# Patient Record
Sex: MALE | Race: WHITE | NOT HISPANIC OR LATINO | URBAN - METROPOLITAN AREA
[De-identification: names, ages, dates, MRNs, and addresses within clinical notes are randomized per-mention and may not be internally consistent; named-entity substitution may affect disease eponyms.]

---

## 2017-10-10 ENCOUNTER — OUTPATIENT (OUTPATIENT)
Dept: OUTPATIENT SERVICES | Facility: HOSPITAL | Age: 77
LOS: 1 days | Discharge: HOME | End: 2017-10-10

## 2017-10-18 DIAGNOSIS — E78.00 PURE HYPERCHOLESTEROLEMIA, UNSPECIFIED: ICD-10-CM

## 2017-10-18 DIAGNOSIS — H25.11 AGE-RELATED NUCLEAR CATARACT, RIGHT EYE: ICD-10-CM

## 2017-10-18 DIAGNOSIS — G47.33 OBSTRUCTIVE SLEEP APNEA (ADULT) (PEDIATRIC): ICD-10-CM

## 2017-10-18 DIAGNOSIS — E11.9 TYPE 2 DIABETES MELLITUS WITHOUT COMPLICATIONS: ICD-10-CM

## 2017-10-18 DIAGNOSIS — I10 ESSENTIAL (PRIMARY) HYPERTENSION: ICD-10-CM

## 2018-01-18 PROBLEM — Z00.00 ENCOUNTER FOR PREVENTIVE HEALTH EXAMINATION: Status: ACTIVE | Noted: 2018-01-18

## 2018-02-09 ENCOUNTER — APPOINTMENT (OUTPATIENT)
Dept: UROLOGY | Facility: CLINIC | Age: 78
End: 2018-02-09
Payer: MEDICARE

## 2018-02-09 VITALS
HEIGHT: 71 IN | HEART RATE: 70 BPM | BODY MASS INDEX: 37.1 KG/M2 | SYSTOLIC BLOOD PRESSURE: 121 MMHG | DIASTOLIC BLOOD PRESSURE: 80 MMHG | WEIGHT: 265 LBS

## 2018-02-09 DIAGNOSIS — Z78.9 OTHER SPECIFIED HEALTH STATUS: ICD-10-CM

## 2018-02-09 DIAGNOSIS — E11.9 TYPE 2 DIABETES MELLITUS W/OUT COMPLICATIONS: ICD-10-CM

## 2018-02-09 DIAGNOSIS — N39.41 URGE INCONTINENCE: ICD-10-CM

## 2018-02-09 DIAGNOSIS — N40.0 BENIGN PROSTATIC HYPERPLASIA WITHOUT LOWER URINARY TRACT SYMPMS: ICD-10-CM

## 2018-02-09 LAB
BILIRUB UR QL STRIP: 1
CLARITY UR: CLEAR
COLLECTION METHOD: NORMAL
GLUCOSE UR-MCNC: NORMAL
HCG UR QL: 2 EU/DL
HGB UR QL STRIP.AUTO: 50
KETONES UR-MCNC: NORMAL
LEUKOCYTE ESTERASE UR QL STRIP: 75
NITRITE UR QL STRIP: NORMAL
PH UR STRIP: 5
PROT UR STRIP-MCNC: 30
SP GR UR STRIP: 1.02

## 2018-02-09 PROCEDURE — 81003 URINALYSIS AUTO W/O SCOPE: CPT | Mod: QW

## 2018-02-09 PROCEDURE — 99213 OFFICE O/P EST LOW 20 MIN: CPT

## 2018-02-09 RX ORDER — METHAZOLAMIDE 50 MG/1
50 TABLET ORAL
Qty: 90 | Refills: 0 | Status: ACTIVE | COMMUNITY
Start: 2017-07-05

## 2018-02-09 RX ORDER — CARVEDILOL 6.25 MG/1
6.25 TABLET, FILM COATED ORAL
Qty: 180 | Refills: 0 | Status: ACTIVE | COMMUNITY
Start: 2017-01-05

## 2018-02-09 RX ORDER — INSULIN LISPRO 100 [IU]/ML
(75-25) 100 INJECTION, SUSPENSION SUBCUTANEOUS
Qty: 60 | Refills: 0 | Status: ACTIVE | COMMUNITY
Start: 2017-04-12

## 2018-02-09 RX ORDER — ASPIRIN 81 MG
81 TABLET, DELAYED RELEASE (ENTERIC COATED) ORAL
Refills: 0 | Status: ACTIVE | COMMUNITY

## 2018-02-09 RX ORDER — PEN NEEDLE, DIABETIC 29 G X1/2"
31G X 5 MM NEEDLE, DISPOSABLE MISCELLANEOUS
Qty: 100 | Refills: 0 | Status: ACTIVE | COMMUNITY
Start: 2017-08-07

## 2018-02-09 RX ORDER — DULAGLUTIDE 1.5 MG/.5ML
1.5 INJECTION, SOLUTION SUBCUTANEOUS
Qty: 6 | Refills: 0 | Status: ACTIVE | COMMUNITY
Start: 2017-07-24

## 2018-02-09 RX ORDER — ENALAPRIL MALEATE 5 MG/1
5 TABLET ORAL
Qty: 90 | Refills: 0 | Status: ACTIVE | COMMUNITY
Start: 2017-03-18

## 2018-02-09 RX ORDER — ATORVASTATIN CALCIUM 20 MG/1
20 TABLET, FILM COATED ORAL
Qty: 90 | Refills: 0 | Status: ACTIVE | COMMUNITY
Start: 2017-07-25

## 2018-02-09 RX ORDER — METFORMIN HYDROCHLORIDE 850 MG/1
850 TABLET, COATED ORAL
Qty: 90 | Refills: 0 | Status: ACTIVE | COMMUNITY
Start: 2016-11-28

## 2018-02-09 RX ORDER — MIRABEGRON 50 MG/1
50 TABLET, FILM COATED, EXTENDED RELEASE ORAL
Qty: 30 | Refills: 0 | Status: ACTIVE | COMMUNITY
Start: 2017-09-04

## 2018-03-20 ENCOUNTER — APPOINTMENT (OUTPATIENT)
Dept: UROLOGY | Facility: CLINIC | Age: 78
End: 2018-03-20
Payer: MEDICARE

## 2018-03-20 VITALS — DIASTOLIC BLOOD PRESSURE: 69 MMHG | HEART RATE: 64 BPM | SYSTOLIC BLOOD PRESSURE: 116 MMHG

## 2018-03-20 PROBLEM — N39.41 URGE INCONTINENCE OF URINE: Status: ACTIVE | Noted: 2018-02-09

## 2018-03-20 LAB
BILIRUB UR QL STRIP: NORMAL
CLARITY UR: CLEAR
COLLECTION METHOD: NORMAL
GLUCOSE UR-MCNC: >500
HCG UR QL: 2 EU/DL
HGB UR QL STRIP.AUTO: NORMAL
KETONES UR-MCNC: NORMAL
LEUKOCYTE ESTERASE UR QL STRIP: NORMAL
NITRITE UR QL STRIP: NORMAL
PH UR STRIP: 6.5
PROT UR STRIP-MCNC: NORMAL
SP GR UR STRIP: 1.02

## 2018-03-20 PROCEDURE — 99213 OFFICE O/P EST LOW 20 MIN: CPT

## 2018-03-20 PROCEDURE — 81003 URINALYSIS AUTO W/O SCOPE: CPT | Mod: QW

## 2018-05-22 ENCOUNTER — APPOINTMENT (OUTPATIENT)
Dept: UROLOGY | Facility: CLINIC | Age: 78
End: 2018-05-22
Payer: MEDICARE

## 2018-05-22 VITALS
HEIGHT: 71 IN | BODY MASS INDEX: 37.1 KG/M2 | WEIGHT: 265 LBS | HEART RATE: 65 BPM | SYSTOLIC BLOOD PRESSURE: 115 MMHG | DIASTOLIC BLOOD PRESSURE: 66 MMHG

## 2018-05-22 PROCEDURE — 99213 OFFICE O/P EST LOW 20 MIN: CPT

## 2019-04-05 ENCOUNTER — APPOINTMENT (OUTPATIENT)
Dept: UROLOGY | Facility: CLINIC | Age: 79
End: 2019-04-05
Payer: MEDICARE

## 2019-04-05 VITALS
BODY MASS INDEX: 37.24 KG/M2 | HEART RATE: 65 BPM | SYSTOLIC BLOOD PRESSURE: 111 MMHG | WEIGHT: 266 LBS | DIASTOLIC BLOOD PRESSURE: 66 MMHG | HEIGHT: 71 IN

## 2019-04-05 PROCEDURE — 99213 OFFICE O/P EST LOW 20 MIN: CPT

## 2019-04-05 NOTE — ASSESSMENT
[FreeTextEntry1] : Elevation in PSA above his baseline may be biochemical recurrence but will repeat to confirm. This still elevated will need imaging and discussion regarding hormonal manipulation

## 2019-04-05 NOTE — HISTORY OF PRESENT ILLNESS
[FreeTextEntry1] : 78-year-old with history of prostate cancer status post radioactive seed implantation and external radiation in 2005 for a [place] 6. He recently saw a radiation oncologist Dr. Stephens. Pretreatment PSA was 9.2 and is currently 6.5 compared to 2.1 one year ago. He has no change in his urinary incontinence and uses a pad and continues on b.i.d. tamsulosin and myrbetriq 50 daily. He has had no relief with VESIcare. There is no new bone pain. There is no gross hematuria. He continues to use a cane for his chronic left knee pain

## 2019-04-23 ENCOUNTER — APPOINTMENT (OUTPATIENT)
Dept: UROLOGY | Facility: CLINIC | Age: 79
End: 2019-04-23
Payer: MEDICARE

## 2019-04-23 VITALS — WEIGHT: 266 LBS | BODY MASS INDEX: 37.24 KG/M2 | HEIGHT: 71 IN

## 2019-04-23 PROCEDURE — 99213 OFFICE O/P EST LOW 20 MIN: CPT

## 2019-04-23 NOTE — HISTORY OF PRESENT ILLNESS
[FreeTextEntry1] : He with history of prostate cancer treated with radioactive seed implantation and external radiation 2005 for Nae 6. Pretreatment PSA was 9.2 and one year ago was 2.1. Recently it was 6.5 and on repeat is 6.6. There is no bone pain or gross hematuria.

## 2019-04-23 NOTE — REVIEW OF SYSTEMS
[Fever] : no fever [Chest Pain] : no chest pain [Dysuria] : no dysuria [Constipation] : no constipation [Confused] : no confusion

## 2019-04-29 ENCOUNTER — OUTPATIENT (OUTPATIENT)
Dept: OUTPATIENT SERVICES | Facility: HOSPITAL | Age: 79
LOS: 1 days | Discharge: HOME | End: 2019-04-29
Payer: MEDICARE

## 2019-04-29 DIAGNOSIS — C61 MALIGNANT NEOPLASM OF PROSTATE: ICD-10-CM

## 2019-04-29 PROCEDURE — 74176 CT ABD & PELVIS W/O CONTRAST: CPT | Mod: 26

## 2019-05-02 ENCOUNTER — OUTPATIENT (OUTPATIENT)
Dept: OUTPATIENT SERVICES | Facility: HOSPITAL | Age: 79
LOS: 1 days | Discharge: HOME | End: 2019-05-02
Payer: MEDICARE

## 2019-05-02 DIAGNOSIS — C61 MALIGNANT NEOPLASM OF PROSTATE: ICD-10-CM

## 2019-05-02 PROCEDURE — 78306 BONE IMAGING WHOLE BODY: CPT | Mod: 26

## 2019-05-02 PROCEDURE — 78320: CPT | Mod: 26

## 2019-05-14 ENCOUNTER — APPOINTMENT (OUTPATIENT)
Dept: UROLOGY | Facility: CLINIC | Age: 79
End: 2019-05-14
Payer: MEDICARE

## 2019-05-14 VITALS — HEIGHT: 71 IN | WEIGHT: 266 LBS | BODY MASS INDEX: 37.24 KG/M2

## 2019-05-14 PROCEDURE — 99213 OFFICE O/P EST LOW 20 MIN: CPT

## 2019-05-14 RX ORDER — BICALUTAMIDE 50 MG/1
50 TABLET ORAL DAILY
Qty: 30 | Refills: 0 | Status: ACTIVE | COMMUNITY
Start: 2019-05-14 | End: 1900-01-01

## 2019-05-14 NOTE — HISTORY OF PRESENT ILLNESS
[FreeTextEntry1] : 78-year-old with history of prostate cancer status post seed implantation 2005 for Nae 6. Pretreatment PSA was 9.2. PSA one year ago was 2.1 and now is 6.6 on repeat. Bone scan shows faint intake of the right femoral neck and CT scan shows a sclerotic focus in the right femoral neck. He is mild twinges of pain are not severe in that area on occasion. There's no change in urination. [Urinary Incontinence] : no urinary incontinence [Urinary Retention] : no urinary retention [Dysuria] : no dysuria [Hematuria - Gross] : no gross hematuria [Abdominal Pain] : no abdominal pain [Flank Pain] : no flank pain

## 2019-05-14 NOTE — PHYSICAL EXAM
[General Appearance - In No Acute Distress] : no acute distress [] : no respiratory distress [Oriented To Time, Place, And Person] : oriented to person, place, and time [FreeTextEntry1] : Uses a cane

## 2019-05-14 NOTE — ASSESSMENT
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 Will get MRI of this area. The meantime start Casodex 50 mg daily in preparation for probable leuprolide next visit

## 2019-05-21 ENCOUNTER — APPOINTMENT (OUTPATIENT)
Dept: UROLOGY | Facility: CLINIC | Age: 79
End: 2019-05-21

## 2019-06-13 ENCOUNTER — OUTPATIENT (OUTPATIENT)
Dept: OUTPATIENT SERVICES | Facility: HOSPITAL | Age: 79
LOS: 1 days | Discharge: HOME | End: 2019-06-13

## 2019-06-13 DIAGNOSIS — C61 MALIGNANT NEOPLASM OF PROSTATE: ICD-10-CM

## 2019-06-13 LAB — CREAT SERPL-MCNC: 0.8 MG/DL

## 2019-06-26 ENCOUNTER — APPOINTMENT (OUTPATIENT)
Dept: UROLOGY | Facility: CLINIC | Age: 79
End: 2019-06-26
Payer: MEDICARE

## 2019-06-26 PROCEDURE — 99213 OFFICE O/P EST LOW 20 MIN: CPT

## 2019-06-26 NOTE — HISTORY OF PRESENT ILLNESS
[FreeTextEntry1] : Gregg 78-year-old with history of prostate cancer status post seed implantation 2005 for [place] 6. Pretreatment PSA was 9.2. PSA one year ago was 2.1 and now is 6.6 on repeat. Bone scan shows faint intake of the right femoral neck and CT scan shows a sclerotic focus in the right femoral neck. He is mild Discomfort in that area on occasion. .\par He saw radiation oncology and is scheduled for an MRI of the pelvis this week. He will continue followup with radiation oncology and scheduled. He presents today for Eligard injection. He has finished his Casodex in preparation. [Urinary Incontinence] : no urinary incontinence [Urinary Retention] : no urinary retention [Dysuria] : no dysuria [Hematuria - Gross] : no gross hematuria [Abdominal Pain] : no abdominal pain [Flank Pain] : no flank pain

## 2019-06-26 NOTE — PHYSICAL EXAM
[General Appearance - In No Acute Distress] : no acute distress [Abdomen Soft] : soft [Abdomen Tenderness] : non-tender [Costovertebral Angle Tenderness] : no ~M costovertebral angle tenderness [] : no respiratory distress [Oriented To Time, Place, And Person] : oriented to person, place, and time [FreeTextEntry1] : Uses a cane

## 2019-06-26 NOTE — ASSESSMENT
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 Status post 3 m\par elgard injection today. Followup in 3 months for injection. Obtain pelvic MRI and followup with radiation oncology.

## 2019-08-26 ENCOUNTER — RX RENEWAL (OUTPATIENT)
Age: 79
End: 2019-08-26

## 2019-09-18 ENCOUNTER — APPOINTMENT (OUTPATIENT)
Dept: UROLOGY | Facility: CLINIC | Age: 79
End: 2019-09-18
Payer: MEDICARE

## 2019-09-18 PROCEDURE — 99213 OFFICE O/P EST LOW 20 MIN: CPT

## 2019-09-18 NOTE — HISTORY OF PRESENT ILLNESS
[FreeTextEntry1] : 78-year-old male history of seed implantation in 2005 for [place] 6 prostate cancer with a pretreatment of 9.2. 3 months ago his PSA had increased to 6.6 from 2.115 months ago. Bone scan showed faint uptake in the right femoral neck and CT scan showed a sclerotic focus there. MRI showed only arthritis. He was given Eligard injection for biochemical recurrence 3 months ago and feels well. He has not had a repeat PSA [Urinary Incontinence] : no urinary incontinence [Urinary Retention] : no urinary retention [Dysuria] : no dysuria [Flank Pain] : no flank pain [Hematuria - Gross] : no gross hematuria

## 2019-09-18 NOTE — REVIEW OF SYSTEMS
[Fever] : no fever [Chest Pain] : no chest pain [Shortness Of Breath] : no shortness of breath [Cough] : no cough [Abdominal Pain] : no abdominal pain [Constipation] : no constipation [Diarrhea] : no diarrhea [Dysuria] : no dysuria [Confused] : no confusion

## 2019-09-18 NOTE — PHYSICAL EXAM
[General Appearance - In No Acute Distress] : no acute distress [Abdomen Soft] : soft [Abdomen Tenderness] : non-tender [Oriented To Time, Place, And Person] : oriented to person, place, and time [] : no respiratory distress [Normal Station and Gait] : the gait and station were normal for the patient's age

## 2019-11-13 ENCOUNTER — RX RENEWAL (OUTPATIENT)
Age: 79
End: 2019-11-13

## 2019-12-18 ENCOUNTER — APPOINTMENT (OUTPATIENT)
Dept: UROLOGY | Facility: CLINIC | Age: 79
End: 2019-12-18
Payer: MEDICARE

## 2019-12-18 PROCEDURE — 99213 OFFICE O/P EST LOW 20 MIN: CPT

## 2019-12-18 NOTE — REVIEW OF SYSTEMS
[Fever] : no fever [Chest Pain] : no chest pain [Dysuria] : no dysuria [Shortness Of Breath] : no shortness of breath [Abdominal Pain] : no abdominal pain [Confused] : no confusion

## 2019-12-18 NOTE — ASSESSMENT
[FreeTextEntry1] : Further discuss with patient's response alternatives and elected withdraw from leuprolide for the time being. We'll recheck PSA in 6 months and reinstitute p.r.n.

## 2019-12-18 NOTE — HISTORY OF PRESENT ILLNESS
[FreeTextEntry1] : History of Nae 6 prostate cancer procedure implantation in 2005 with a pretreatment of 9.2 PSA. He had a biochemical recurrence for which he was started on Eligard. There's a question of a sclerotic focus in the right femoral neck on CT scan. His PSA is now 0.2. There is no bone pain, he is complaining of bloating and hot flashes and would like to withdraw as possible from the Eligard

## 2020-01-19 ENCOUNTER — RX RENEWAL (OUTPATIENT)
Age: 80
End: 2020-01-19

## 2020-04-27 ENCOUNTER — RX RENEWAL (OUTPATIENT)
Age: 80
End: 2020-04-27

## 2020-06-19 ENCOUNTER — APPOINTMENT (OUTPATIENT)
Dept: UROLOGY | Facility: CLINIC | Age: 80
End: 2020-06-19
Payer: MEDICARE

## 2020-06-19 VITALS — WEIGHT: 266 LBS | TEMPERATURE: 98.5 F | HEIGHT: 71 IN | BODY MASS INDEX: 37.24 KG/M2

## 2020-06-19 PROCEDURE — 99213 OFFICE O/P EST LOW 20 MIN: CPT

## 2020-06-19 NOTE — REVIEW OF SYSTEMS
[Fever] : no fever [Discharge From Eyes] : no purulent discharge from the eyes [Feeling Poorly] : not feeling poorly [Eyesight Problems] : no eyesight problems [Sore Throat] : no sore throat [Heart Rate Is Slow] : the heart rate was not slow [Nasal Discharge] : no nasal discharge [Shortness Of Breath] : no shortness of breath [Chest Pain] : no chest pain [Cough] : no cough [Diarrhea] : no diarrhea [Constipation] : no constipation [Confused] : no confusion

## 2020-06-19 NOTE — HISTORY OF PRESENT ILLNESS
[FreeTextEntry1] : 79-year-old with prostate cancer post seed implantation with biochemical recurrence now on leuprolide withdrawal. PSA is 0.5 compared to 0.26 months ago. He has no new bone pain, no change in urination, no hematuria, no dysuria.  He does have some right knee pain likely arthritic and has an appointment with orthopedic.  He has some mild urgency frequency and low volume occasional incontinence secondary to poor ambulation

## 2020-07-22 ENCOUNTER — RX RENEWAL (OUTPATIENT)
Age: 80
End: 2020-07-22

## 2020-10-27 ENCOUNTER — RX RENEWAL (OUTPATIENT)
Age: 80
End: 2020-10-27

## 2020-10-27 RX ORDER — TAMSULOSIN HYDROCHLORIDE 0.4 MG/1
0.4 CAPSULE ORAL
Qty: 90 | Refills: 3 | Status: ACTIVE | COMMUNITY
Start: 2018-02-09 | End: 1900-01-01

## 2021-01-13 ENCOUNTER — RX RENEWAL (OUTPATIENT)
Age: 81
End: 2021-01-13

## 2021-02-10 ENCOUNTER — APPOINTMENT (OUTPATIENT)
Dept: UROLOGY | Facility: CLINIC | Age: 81
End: 2021-02-10
Payer: MEDICARE

## 2021-02-10 VITALS — HEIGHT: 71 IN | WEIGHT: 266 LBS | BODY MASS INDEX: 37.24 KG/M2 | TEMPERATURE: 97.6 F

## 2021-02-10 PROCEDURE — 96402 CHEMO HORMON ANTINEOPL SQ/IM: CPT

## 2021-02-10 NOTE — ASSESSMENT
[FreeTextEntry1] : Rapid increase in PSA since 6 months prior. Discussed fully with the patient. He remains asymptomatic. Due to rapid rise will start Eligard 3 month Depo. lot [id number] exp 9/22

## 2021-02-10 NOTE — HISTORY OF PRESENT ILLNESS
[FreeTextEntry1] : 80-year-old with prostate cancer post seed implantation with biochemical recurrence. He is on leuprolide withdrawal. PSA is 2.9 compared to 0.5x6 months prior. There's no new bone pain, no change in urination, no hematuria, no dysuria

## 2021-02-10 NOTE — PHYSICAL EXAM
[General Appearance - In No Acute Distress] : no acute distress [Abdomen Soft] : soft [Abdomen Tenderness] : non-tender [] : no respiratory distress [Oriented To Time, Place, And Person] : oriented to person, place, and time [FreeTextEntry1] : Uses a cane

## 2021-02-10 NOTE — REVIEW OF SYSTEMS
[Feeling Poorly] : not feeling poorly [Feeling Tired] : not feeling tired [Discharge From Eyes] : no purulent discharge from the eyes [Nasal Discharge] : no nasal discharge [Cough] : no cough [Constipation] : no constipation [Dysuria] : no dysuria [Confused] : no confusion

## 2021-04-26 ENCOUNTER — RX RENEWAL (OUTPATIENT)
Age: 81
End: 2021-04-26

## 2021-04-26 RX ORDER — TAMSULOSIN HYDROCHLORIDE 0.4 MG/1
0.4 CAPSULE ORAL
Qty: 60 | Refills: 2 | Status: ACTIVE | COMMUNITY
Start: 2018-03-20 | End: 1900-01-01

## 2021-05-11 ENCOUNTER — APPOINTMENT (OUTPATIENT)
Dept: UROLOGY | Facility: CLINIC | Age: 81
End: 2021-05-11
Payer: MEDICARE

## 2021-05-11 DIAGNOSIS — R39.9 UNSPECIFIED SYMPTOMS AND SIGNS INVOLVING THE GENITOURINARY SYSTEM: ICD-10-CM

## 2021-05-11 DIAGNOSIS — R35.0 FREQUENCY OF MICTURITION: ICD-10-CM

## 2021-05-11 PROCEDURE — 99213 OFFICE O/P EST LOW 20 MIN: CPT

## 2021-05-11 NOTE — ASSESSMENT
[Urinary Symptom or Sign (788.99\R39.89)] : implantation [FreeTextEntry1] : Asymptomatic biochemical recurrence with improved PSA. Patient prefers to withdraw from leuprolide again recheck PSA in 3 months

## 2021-05-11 NOTE — HISTORY OF PRESENT ILLNESS
[FreeTextEntry1] : 80-year-old with prostate cancer status post seed implantation with biochemical recurrence. He is on intermittent leuprolide and receive leuprolide 3 months ago with a PSA of 2.9. Repeat PSA on March 31 was 0.9. He has no urinary complaints other than some urinary frequency, nocturia. There is no new bone pain.

## 2021-08-24 ENCOUNTER — APPOINTMENT (OUTPATIENT)
Dept: UROLOGY | Facility: CLINIC | Age: 81
End: 2021-08-24
Payer: MEDICARE

## 2021-08-24 PROCEDURE — 99213 OFFICE O/P EST LOW 20 MIN: CPT

## 2021-08-24 NOTE — HISTORY OF PRESENT ILLNESS
[FreeTextEntry1] : 80-year-old with prostate cancer, seed implantation in the past, now with biochemical recurrence on intermittent leuprolide.  Last leuprolide was 6 months ago when his PSA was 2.9.  Current PSA is 0.2 which is improved from 0.93 months ago.  He has no back pain, no bone pain, no hematuria, no dysuria.  He does have some low volume urinary incontinence and uses a adult diaper but wants no treatment for this

## 2022-03-01 ENCOUNTER — APPOINTMENT (OUTPATIENT)
Dept: UROLOGY | Facility: CLINIC | Age: 82
End: 2022-03-01
Payer: MEDICARE

## 2022-03-01 PROCEDURE — 99213 OFFICE O/P EST LOW 20 MIN: CPT

## 2022-03-01 NOTE — PHYSICAL EXAM
[General Appearance - In No Acute Distress] : no acute distress [] : no respiratory distress [Oriented To Time, Place, And Person] : oriented to person, place, and time [FreeTextEntry1] : ambulates with cane.

## 2022-03-01 NOTE — ASSESSMENT
[FreeTextEntry1] : 81 year old with prostate cancer. \par Currently on intermittent Lupron. Last injection 1 year ago. \par No new PSA to review. \par \par Plan\par -PSA  ASAP\par -Patient instructed to call office next week to review PSA and discuss follow up plan. Patient verbalized understanding.

## 2022-03-01 NOTE — END OF VISIT
[FreeTextEntry3] : I have participated in obtaining the history, physical exam, formulated a treatment plan which I discussed with the patient agree with the above transcription by the physicians assistant

## 2022-03-01 NOTE — HISTORY OF PRESENT ILLNESS
[FreeTextEntry1] : 81 year old with prostate cancer, seed implantation in past, now with BCR on intermittent leuprolide. Last injection was 1 year ago when his PSA was 2.9 ng/mL. 6 months ago PSA was 0.2 ng/mL. No new PSA to review this visit. Patient states that he did not remember. \par Patient reports feeling well and denies changes in urinary incontinence. Continues to use adult diapers.

## 2022-03-02 ENCOUNTER — NON-APPOINTMENT (OUTPATIENT)
Age: 82
End: 2022-03-02

## 2022-03-02 LAB — PSA SERPL-MCNC: 6.41 NG/ML

## 2022-03-04 ENCOUNTER — APPOINTMENT (OUTPATIENT)
Dept: UROLOGY | Facility: CLINIC | Age: 82
End: 2022-03-04
Payer: MEDICARE

## 2022-03-04 VITALS — HEIGHT: 71 IN | WEIGHT: 270 LBS | BODY MASS INDEX: 37.8 KG/M2

## 2022-03-04 PROCEDURE — 96402 CHEMO HORMON ANTINEOPL SQ/IM: CPT

## 2022-03-04 RX ORDER — DORZOLAMIDE HYDROCHLORIDE TIMOLOL MALEATE 20; 5 MG/ML; MG/ML
22.3-6.8 SOLUTION/ DROPS OPHTHALMIC
Qty: 10 | Refills: 0 | Status: ACTIVE | COMMUNITY
Start: 2021-07-12

## 2022-03-04 RX ORDER — SEMAGLUTIDE 1.34 MG/ML
4 INJECTION, SOLUTION SUBCUTANEOUS
Qty: 9 | Refills: 0 | Status: ACTIVE | COMMUNITY
Start: 2021-11-04

## 2022-03-04 RX ORDER — MELOXICAM 15 MG/1
15 TABLET ORAL
Qty: 30 | Refills: 0 | Status: ACTIVE | COMMUNITY
Start: 2021-07-29

## 2022-06-17 ENCOUNTER — APPOINTMENT (OUTPATIENT)
Dept: UROLOGY | Facility: CLINIC | Age: 82
End: 2022-06-17
Payer: MEDICARE

## 2022-06-17 VITALS — WEIGHT: 272 LBS | BODY MASS INDEX: 38.94 KG/M2 | HEIGHT: 70 IN

## 2022-06-17 PROCEDURE — 99213 OFFICE O/P EST LOW 20 MIN: CPT

## 2022-06-17 NOTE — ASSESSMENT
[FreeTextEntry1] : Good biochemical response to leuprolide 3 months ago.  Patient wants to withdraw now.  We will recheck PSA in 3 months

## 2022-06-17 NOTE — HISTORY OF PRESENT ILLNESS
[FreeTextEntry1] : 81-year-old with prostate cancer, history of seed implantation with biochemical recurrence on intermittent leuprolide.  He received leuprolide 3 months ago when his PSA increased to 6.4 off leuprolide.  New PSA 0.42.  No urinary changes.  Continue to use adult diapers for urinary incontinence.

## 2022-08-10 ENCOUNTER — APPOINTMENT (OUTPATIENT)
Dept: ORTHOPEDIC SURGERY | Facility: CLINIC | Age: 82
End: 2022-08-10

## 2022-08-13 ENCOUNTER — APPOINTMENT (OUTPATIENT)
Age: 82
End: 2022-08-13

## 2022-08-13 VITALS
SYSTOLIC BLOOD PRESSURE: 144 MMHG | DIASTOLIC BLOOD PRESSURE: 82 MMHG | HEART RATE: 64 BPM | WEIGHT: 272 LBS | RESPIRATION RATE: 15 BRPM | BODY MASS INDEX: 38.94 KG/M2 | OXYGEN SATURATION: 96 % | HEIGHT: 70 IN

## 2022-08-13 DIAGNOSIS — G47.33 OBSTRUCTIVE SLEEP APNEA (ADULT) (PEDIATRIC): ICD-10-CM

## 2022-08-13 PROCEDURE — 99213 OFFICE O/P EST LOW 20 MIN: CPT

## 2022-08-13 NOTE — REASON FOR VISIT
[Follow-Up] : a follow-up visit [Sleep Apnea] : sleep apnea [Obesity] : obesity [TextBox_44] : Patient with a history of sleep apnea doing very well with therapy not having any overt issues.  He is using his equipment every night.

## 2022-08-13 NOTE — ASSESSMENT
[FreeTextEntry1] : Assessment:\par TODD\par Obesity\par \par PLAN:\par The patient is benefitting from the PAP device .\par New supplies ordered \par Weight loss discussed\par I stressed the need maintain compliance  with the PAP device.\par The patient is not to use an Ozone or UV sterilizer. \par F/U in 12 months\par \par

## 2022-09-16 ENCOUNTER — APPOINTMENT (OUTPATIENT)
Dept: UROLOGY | Facility: CLINIC | Age: 82
End: 2022-09-16

## 2022-09-16 VITALS
DIASTOLIC BLOOD PRESSURE: 74 MMHG | RESPIRATION RATE: 16 BRPM | WEIGHT: 272 LBS | SYSTOLIC BLOOD PRESSURE: 141 MMHG | HEART RATE: 71 BPM | HEIGHT: 70 IN | BODY MASS INDEX: 38.94 KG/M2 | TEMPERATURE: 98 F

## 2022-09-16 PROCEDURE — 96402 CHEMO HORMON ANTINEOPL SQ/IM: CPT

## 2022-09-16 NOTE — END OF VISIT
[FreeTextEntry3] : I participated in obtaining the history, reviewed labs, discussed treatment plan with patient and agree with the above transcription by the physicians assistant

## 2022-09-16 NOTE — ASSESSMENT
[FreeTextEntry1] : Prostate cancer, seed implantation, BCR on intermittent leuprolide.\par \par Last injection March of 2022. \par PSA in May of 2022 0.4 ng/mL. \par PSA 09/2022 is 2.34 ng/mL. \par \par Plan\par -Eligard injection today\par -Follow up 3 months with repeat PSA.

## 2022-09-16 NOTE — HISTORY OF PRESENT ILLNESS
[FreeTextEntry1] : 81 year old with prostate cancer. History of seed implantation with biochemical recurrence on intermittent leuprolide. Last injection was in March of 2022 when PSA was 6.4 ng/mL. Follow up PSA was 0.42 ng/mL. Patient presents to office with repeat PSA from 09/2022 which is 2.34 ng/mL. Patient reports he feels well. Denies dysuria and gross hematuria. Does have urinary incontinence and uses adult diaper.

## 2022-09-27 ENCOUNTER — APPOINTMENT (OUTPATIENT)
Dept: ORTHOPEDIC SURGERY | Facility: CLINIC | Age: 82
End: 2022-09-27

## 2022-09-27 PROCEDURE — 99213 OFFICE O/P EST LOW 20 MIN: CPT

## 2022-09-27 NOTE — REASON FOR VISIT
[FreeTextEntry2] : follow up for bilateral knee pain  Feels better Mobic still use a cane weight is unchanged still able to travel train [place] visit family  hemoglobin A1c 7 0

## 2022-09-27 NOTE — ASSESSMENT
[FreeTextEntry1] :  Mobic is helpful cane is good heat work on weight reduction no reason for any injections I will see him in 6 months

## 2022-09-27 NOTE — HISTORY OF PRESENT ILLNESS
[de-identified] : Patient Complaint - Bilateral knee pain feels about 80% better since he was placed on the Mobic still some difficulty\par with stairs driving is fine it does bother him a little bit when he tries to garden he did not lose any more weight uses a\par cane has to be careful with stairs\par History of Present Illness\par This is a 79-year-old gentleman retired  who was been seen for several years by Dr. 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 Was quite active his weight is elevated

## 2022-12-09 ENCOUNTER — APPOINTMENT (OUTPATIENT)
Dept: UROLOGY | Facility: CLINIC | Age: 82
End: 2022-12-09

## 2022-12-09 VITALS
WEIGHT: 276 LBS | HEIGHT: 70 IN | BODY MASS INDEX: 39.51 KG/M2 | SYSTOLIC BLOOD PRESSURE: 182 MMHG | DIASTOLIC BLOOD PRESSURE: 86 MMHG | HEART RATE: 62 BPM

## 2022-12-09 DIAGNOSIS — R32 UNSPECIFIED URINARY INCONTINENCE: ICD-10-CM

## 2022-12-09 PROCEDURE — 99213 OFFICE O/P EST LOW 20 MIN: CPT

## 2022-12-09 NOTE — HISTORY OF PRESENT ILLNESS
[FreeTextEntry1] : 82-year-old with history of prostate cancer, seed implantation with biochemical recurrence on intermittent leuprolide.  Last leuprolide injection was a 3-month Eligard September 16, 2022.  Current PSA 0.44 compared to 2.343 months ago prior to the leuprolide.  He feels well.\par \par Continues to utilize adult diaper for urinary incontinence.

## 2023-03-20 ENCOUNTER — APPOINTMENT (OUTPATIENT)
Dept: ORTHOPEDIC SURGERY | Facility: CLINIC | Age: 83
End: 2023-03-20
Payer: MEDICARE

## 2023-03-20 PROCEDURE — 99213 OFFICE O/P EST LOW 20 MIN: CPT

## 2023-03-20 NOTE — ASSESSMENT
[FreeTextEntry1] :  Mobic is helpful cane is good heat work on weight reduction no reason for any injections I will see him in 6 months prn

## 2023-03-20 NOTE — HISTORY OF PRESENT ILLNESS
[de-identified] : Patient Complaint - Bilateral knee pain feels about 80% better since he was placed on the Mobic still some difficulty\par with stairs driving is fine it does bother him a little bit when he tries to garden he did not lose any more weight uses a\par cane has to be careful with stairs\par History of Present Illness\par This is a 79-year-old gentleman retired  who was been seen for several years by Dr. 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 Was quite active his weight is elevated

## 2023-06-09 ENCOUNTER — APPOINTMENT (OUTPATIENT)
Dept: UROLOGY | Facility: CLINIC | Age: 83
End: 2023-06-09
Payer: MEDICARE

## 2023-06-09 VITALS
HEART RATE: 67 BPM | RESPIRATION RATE: 15 BRPM | TEMPERATURE: 98 F | WEIGHT: 276 LBS | HEIGHT: 70 IN | DIASTOLIC BLOOD PRESSURE: 75 MMHG | BODY MASS INDEX: 39.51 KG/M2 | OXYGEN SATURATION: 98 % | SYSTOLIC BLOOD PRESSURE: 180 MMHG

## 2023-06-09 DIAGNOSIS — R97.20 ELEVATED PROSTATE, SPECIFIC ANTIGEN [PSA]: ICD-10-CM

## 2023-06-09 PROCEDURE — 99214 OFFICE O/P EST MOD 30 MIN: CPT

## 2023-06-09 NOTE — HISTORY OF PRESENT ILLNESS
[FreeTextEntry1] : 82-year-old with history of prostate cancer, seed implantation with biochemical recurrence on intermittent leuprolide.  Last leuprolide injection was September 16, 2022.  6 months ago his PSA was 0.44 and now it is 15.  He has no urinary complaints, no bone pain.  He feels well

## 2023-06-09 NOTE — ASSESSMENT
[FreeTextEntry1] : Biochemical recurrence with rapid rise in PSA.  Recommend restarting leuprolide and patient is agreeable.  Leuprolide 3-month depot given today.  See hormone injection note.  New PSA ordered for 3 months from now return to office then

## 2023-06-09 NOTE — PHYSICAL EXAM
Health Maintenance Due   Topic Date Due   • Hepatitis C Screening  09/11/1996   • Abdominal Aortic Aneurysm (AAA) Screening  09/11/2010       Patient is due for above. Provider to review with patient.            [General Appearance - In No Acute Distress] : no acute distress [] : no respiratory distress [Oriented To Time, Place, And Person] : oriented to person, place, and time [FreeTextEntry1] : Uses a cane

## 2023-08-10 ENCOUNTER — APPOINTMENT (OUTPATIENT)
Dept: PULMONOLOGY | Facility: CLINIC | Age: 83
End: 2023-08-10
Payer: MEDICARE

## 2023-08-10 VITALS
BODY MASS INDEX: 38.5 KG/M2 | WEIGHT: 275 LBS | OXYGEN SATURATION: 97 % | DIASTOLIC BLOOD PRESSURE: 80 MMHG | SYSTOLIC BLOOD PRESSURE: 120 MMHG | RESPIRATION RATE: 14 BRPM | HEIGHT: 71 IN | HEART RATE: 102 BPM

## 2023-08-10 DIAGNOSIS — E66.9 OBESITY, UNSPECIFIED: ICD-10-CM

## 2023-08-10 PROCEDURE — 99213 OFFICE O/P EST LOW 20 MIN: CPT

## 2023-08-10 NOTE — REASON FOR VISIT
[Follow-Up] : a follow-up visit [Sleep Apnea] : sleep apnea [Obesity] : obesity [TextBox_44] : Doing well not having issues with the CPAP.  He is very compliant.  He has a travel CPAP that he uses when he visits his son in [place]

## 2023-09-05 ENCOUNTER — APPOINTMENT (OUTPATIENT)
Dept: ORTHOPEDIC SURGERY | Facility: CLINIC | Age: 83
End: 2023-09-05
Payer: MEDICARE

## 2023-09-05 DIAGNOSIS — M17.0 BILATERAL PRIMARY OSTEOARTHRITIS OF KNEE: ICD-10-CM

## 2023-09-05 PROCEDURE — 99213 OFFICE O/P EST LOW 20 MIN: CPT

## 2023-09-05 NOTE — HISTORY OF PRESENT ILLNESS
[de-identified] : Patient Complaint - Bilateral knee pain feels about 80% better since he was placed on the Mobic still some difficulty\par  with stairs driving is fine it does bother him a little bit when he tries to garden he did not lose any more weight uses a\par  cane has to be careful with stairs\par  History of Present Illness\par  This is a 79-year-old gentleman retired  who was been seen for several years by Dr. 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 Was quite active his weight is elevated

## 2023-09-08 ENCOUNTER — APPOINTMENT (OUTPATIENT)
Dept: UROLOGY | Facility: CLINIC | Age: 83
End: 2023-09-08
Payer: MEDICARE

## 2023-09-08 PROCEDURE — 99212 OFFICE O/P EST SF 10 MIN: CPT

## 2023-09-08 NOTE — HISTORY OF PRESENT ILLNESS
[FreeTextEntry1] : 82-year-old with prostate cancer status post seed implantation now with biochemical recurrence on intermittent leuprolide.  No bone pain and no urinary complaint.  He was seen June 9, 2023 with a rapid increase in PSA to 15 though asymptomatic.  He was started on Eligard and now his PSA is 0.7.  No bone pain, no urinary complaint.

## 2023-09-08 NOTE — ASSESSMENT
[FreeTextEntry1] : Patient with biochemical recurrence of prostate cancer with good response to Eligard.  He prefers now to withdraw again and return to office 3 months and recheck PSA.

## 2023-09-19 ENCOUNTER — APPOINTMENT (OUTPATIENT)
Dept: ORTHOPEDIC SURGERY | Facility: CLINIC | Age: 83
End: 2023-09-19

## 2023-11-16 ENCOUNTER — RX RENEWAL (OUTPATIENT)
Age: 83
End: 2023-11-16

## 2023-11-16 RX ORDER — MELOXICAM 15 MG/1
15 TABLET ORAL
Qty: 30 | Refills: 11 | Status: ACTIVE | COMMUNITY
Start: 2022-09-27 | End: 1900-01-01

## 2023-12-08 ENCOUNTER — APPOINTMENT (OUTPATIENT)
Dept: UROLOGY | Facility: CLINIC | Age: 83
End: 2023-12-08
Payer: MEDICARE

## 2023-12-08 LAB
BILIRUB UR QL STRIP: NORMAL
COLLECTION METHOD: NORMAL
GLUCOSE UR-MCNC: NORMAL
HCG UR QL: 1 EU/DL
HGB UR QL STRIP.AUTO: NORMAL
KETONES UR-MCNC: NORMAL
LEUKOCYTE ESTERASE UR QL STRIP: NORMAL
NITRITE UR QL STRIP: NORMAL
PH UR STRIP: 6
PROT UR STRIP-MCNC: NORMAL
SP GR UR STRIP: 1.02

## 2023-12-08 PROCEDURE — 96402 CHEMO HORMON ANTINEOPL SQ/IM: CPT

## 2023-12-08 PROCEDURE — 81003 URINALYSIS AUTO W/O SCOPE: CPT | Mod: QW

## 2024-03-05 ENCOUNTER — APPOINTMENT (OUTPATIENT)
Dept: ORTHOPEDIC SURGERY | Facility: CLINIC | Age: 84
End: 2024-03-05

## 2024-03-08 ENCOUNTER — APPOINTMENT (OUTPATIENT)
Dept: UROLOGY | Facility: CLINIC | Age: 84
End: 2024-03-08
Payer: MEDICARE

## 2024-03-08 PROCEDURE — 96402 CHEMO HORMON ANTINEOPL SQ/IM: CPT

## 2024-03-08 PROCEDURE — 81003 URINALYSIS AUTO W/O SCOPE: CPT | Mod: QW

## 2024-06-14 ENCOUNTER — APPOINTMENT (OUTPATIENT)
Dept: UROLOGY | Facility: CLINIC | Age: 84
End: 2024-06-14
Payer: MEDICARE

## 2024-06-14 DIAGNOSIS — C61 MALIGNANT NEOPLASM OF PROSTATE: ICD-10-CM

## 2024-06-14 PROCEDURE — 99213 OFFICE O/P EST LOW 20 MIN: CPT

## 2024-06-14 NOTE — HISTORY OF PRESENT ILLNESS
[FreeTextEntry1] : 83-year-old male with h/o prostate cancer status post seed implantation now with biochemical recurrence on intermittent leuprolide.  No bone pain and no urinary complaint.  Eligard therapy was restarted 6/2023 for a PSA of 15.3 last injection was 3 months ago  PSA- 6/2024-  0.3           3/2024- 0.4

## 2024-06-14 NOTE — ASSESSMENT
[FreeTextEntry1] : 1. biochemical recurrence of prostate cancer with good response to Eligard.   Plan: He prefers now to withdraw again and return to office 3 months and recheck PSA.

## 2024-06-14 NOTE — PHYSICAL EXAM
[General Appearance - In No Acute Distress] : no acute distress [] : no respiratory distress [Oriented To Time, Place, And Person] : oriented to person, place, and time [Normal Station and Gait] : the gait and station were normal for the patient's age [FreeTextEntry1] : ambulates with cane

## 2024-08-05 ENCOUNTER — APPOINTMENT (OUTPATIENT)
Dept: PULMONOLOGY | Facility: CLINIC | Age: 84
End: 2024-08-05

## 2024-08-05 PROCEDURE — 99214 OFFICE O/P EST MOD 30 MIN: CPT

## 2024-08-05 PROCEDURE — G2211 COMPLEX E/M VISIT ADD ON: CPT

## 2024-08-05 NOTE — HISTORY OF PRESENT ILLNESS
[TextBox_4] : Subjective: - Summary : Gregg Mcgowan is a patient living with sleep apnea. He uses the Continuous Positive Airway Pressure (CPAP) machine regularly at night for managing his condition. He experiences no issues with the machine, except for occasional cold nose. He also reports having problems in the past with the vendor fulfilling orders, but it has been resolved and he has the supplies he needs. - Chief Complaint (CC) : Annual Review of Sleep Apnea - History of Present Illness (HPI) : The patient with history of sleep apnea, currently utilizing CPAP machine regularly reports no distress or complaints related to the use of the device. He occasionally experiences a cold nose due to its use. The patient had experienced some issues with sleep supplies in the past, with delayed delivery from his supplier but the problem is currently resolved. Patient has sleep apnea since long time and did mention about dosing off leading to a couple of almost accidents before he was using the machine.

## 2024-08-05 NOTE — ASSESSMENT
[FreeTextEntry1] : Assessment: - Summary : Mr. Mcgowan has shown positive responses to the treatment of sleep apnea with the use of the CPAP machine. His fatigue has improved significantly, and he reports better quality of sleep - Problems : - Sleep apnea  - Differential Diagnosis : - Chronic fatigue syndrome  - Narcolepsy  - Restless leg syndrome  Plan: - Summary : Continued regular use of CPAP machine, with annual checkups for maintenance of the machine. Replace the machine every five years or sooner if mechanical issues arise. The patient is advised to report immediately if any problems related to the machine arise. - Plan : - Annual health maintenance visit  - Replacement of CPAP machine every 5 years or sooner in the case of mechanical issues  - Continuous use of CPAP

## 2024-09-12 ENCOUNTER — NON-APPOINTMENT (OUTPATIENT)
Age: 84
End: 2024-09-12

## 2024-09-13 ENCOUNTER — APPOINTMENT (OUTPATIENT)
Dept: UROLOGY | Facility: CLINIC | Age: 84
End: 2024-09-13
Payer: MEDICARE

## 2024-09-13 VITALS
OXYGEN SATURATION: 97 % | WEIGHT: 276 LBS | HEART RATE: 119 BPM | DIASTOLIC BLOOD PRESSURE: 77 MMHG | RESPIRATION RATE: 17 BRPM | BODY MASS INDEX: 38.64 KG/M2 | TEMPERATURE: 98 F | SYSTOLIC BLOOD PRESSURE: 150 MMHG | HEIGHT: 71 IN

## 2024-09-13 DIAGNOSIS — R97.20 ELEVATED PROSTATE, SPECIFIC ANTIGEN [PSA]: ICD-10-CM

## 2024-09-13 DIAGNOSIS — C61 MALIGNANT NEOPLASM OF PROSTATE: ICD-10-CM

## 2024-09-13 PROCEDURE — 99213 OFFICE O/P EST LOW 20 MIN: CPT

## 2024-09-13 PROCEDURE — G2211 COMPLEX E/M VISIT ADD ON: CPT

## 2024-09-13 NOTE — ASSESSMENT
[FreeTextEntry1] : 1. biochemical recurrence of prostate cancer with good response to Eligard. - now off cycle with low PSA  Plan: -He prefers to continue to hold eligard therapy at this time and return to the office in 3 months with a new PSA.

## 2024-09-13 NOTE — END OF VISIT
[Time Spent: ___ minutes] : I have spent [unfilled] minutes of time on the encounter which excludes teaching and separately reported services. [FreeTextEntry3] : I was immediately available, supervised the office visit and agree with the above transcription by the physicians assistant

## 2024-09-13 NOTE — HISTORY OF PRESENT ILLNESS
[FreeTextEntry1] : 83-year-old male with h/o prostate cancer status post seed implantation now with biochemical recurrence on intermittent leuprolide.  No bone pain and no urinary complaint.  Good appetite denies hematuria or fevers  Eligard therapy was restarted 6/2023 for a PSA of 15.3 last injection was 6 months ago (3/2024)  PSA- 9/2024- 0.4          6/2024-  0.3           3/2024- 0.4

## 2024-11-22 ENCOUNTER — RX RENEWAL (OUTPATIENT)
Age: 84
End: 2024-11-22

## 2024-12-17 ENCOUNTER — APPOINTMENT (OUTPATIENT)
Dept: UROLOGY | Facility: CLINIC | Age: 84
End: 2024-12-17
Payer: MEDICARE

## 2024-12-17 PROCEDURE — 99214 OFFICE O/P EST MOD 30 MIN: CPT | Mod: 25

## 2024-12-17 PROCEDURE — 96402 CHEMO HORMON ANTINEOPL SQ/IM: CPT

## 2025-03-12 ENCOUNTER — APPOINTMENT (OUTPATIENT)
Dept: UROLOGY | Facility: CLINIC | Age: 85
End: 2025-03-12
Payer: MEDICARE

## 2025-03-12 VITALS
BODY MASS INDEX: 37.1 KG/M2 | DIASTOLIC BLOOD PRESSURE: 66 MMHG | OXYGEN SATURATION: 97 % | TEMPERATURE: 97.4 F | HEART RATE: 48 BPM | RESPIRATION RATE: 19 BRPM | WEIGHT: 265 LBS | HEIGHT: 71 IN | SYSTOLIC BLOOD PRESSURE: 139 MMHG

## 2025-03-12 DIAGNOSIS — C61 MALIGNANT NEOPLASM OF PROSTATE: ICD-10-CM

## 2025-03-12 PROCEDURE — 99213 OFFICE O/P EST LOW 20 MIN: CPT

## 2025-04-02 ENCOUNTER — APPOINTMENT (OUTPATIENT)
Dept: ORTHOPEDIC SURGERY | Facility: CLINIC | Age: 85
End: 2025-04-02
Payer: COMMERCIAL

## 2025-04-02 DIAGNOSIS — M17.0 BILATERAL PRIMARY OSTEOARTHRITIS OF KNEE: ICD-10-CM

## 2025-04-02 PROCEDURE — G2211 COMPLEX E/M VISIT ADD ON: CPT | Mod: NC

## 2025-04-02 PROCEDURE — 73560 X-RAY EXAM OF KNEE 1 OR 2: CPT | Mod: 50

## 2025-04-02 PROCEDURE — 99213 OFFICE O/P EST LOW 20 MIN: CPT

## 2025-04-02 PROCEDURE — 99203 OFFICE O/P NEW LOW 30 MIN: CPT

## 2025-07-30 DIAGNOSIS — C61 MALIGNANT NEOPLASM OF PROSTATE: ICD-10-CM

## 2025-08-06 ENCOUNTER — APPOINTMENT (OUTPATIENT)
Dept: PULMONOLOGY | Facility: CLINIC | Age: 85
End: 2025-08-06
Payer: MEDICARE

## 2025-08-06 VITALS
WEIGHT: 250 LBS | DIASTOLIC BLOOD PRESSURE: 80 MMHG | RESPIRATION RATE: 17 BRPM | SYSTOLIC BLOOD PRESSURE: 130 MMHG | HEIGHT: 71 IN | OXYGEN SATURATION: 99 % | HEART RATE: 74 BPM | BODY MASS INDEX: 35 KG/M2

## 2025-08-06 DIAGNOSIS — E66.9 OBESITY, UNSPECIFIED: ICD-10-CM

## 2025-08-06 DIAGNOSIS — G47.33 OBSTRUCTIVE SLEEP APNEA (ADULT) (PEDIATRIC): ICD-10-CM

## 2025-08-06 PROCEDURE — 99213 OFFICE O/P EST LOW 20 MIN: CPT

## 2025-08-06 PROCEDURE — G2211 COMPLEX E/M VISIT ADD ON: CPT
